# Patient Record
Sex: MALE | Race: BLACK OR AFRICAN AMERICAN | Employment: FULL TIME | URBAN - METROPOLITAN AREA
[De-identification: names, ages, dates, MRNs, and addresses within clinical notes are randomized per-mention and may not be internally consistent; named-entity substitution may affect disease eponyms.]

---

## 2017-02-16 ENCOUNTER — OP HISTORICAL/CONVERTED ENCOUNTER (OUTPATIENT)
Dept: OTHER | Age: 62
End: 2017-02-16

## 2018-12-13 LAB — PSA, EXTERNAL: 0.9

## 2019-06-13 LAB — CREATININE, EXTERNAL: 1.16

## 2020-06-22 VITALS
WEIGHT: 177.4 LBS | HEIGHT: 69 IN | DIASTOLIC BLOOD PRESSURE: 86 MMHG | HEART RATE: 85 BPM | RESPIRATION RATE: 18 BRPM | SYSTOLIC BLOOD PRESSURE: 148 MMHG | BODY MASS INDEX: 26.28 KG/M2 | TEMPERATURE: 97.3 F | OXYGEN SATURATION: 97 %

## 2020-06-22 PROBLEM — D17.1 LIPOMA OF BACK: Status: ACTIVE | Noted: 2020-06-22

## 2020-06-22 PROBLEM — J30.9 ALLERGIC RHINITIS: Status: ACTIVE | Noted: 2020-06-22

## 2020-06-22 PROBLEM — R03.0 ELEVATED BLOOD PRESSURE READING IN OFFICE WITHOUT DIAGNOSIS OF HYPERTENSION: Status: ACTIVE | Noted: 2020-06-22

## 2020-06-22 RX ORDER — METOPROLOL SUCCINATE 50 MG/1
TABLET, EXTENDED RELEASE ORAL DAILY
COMMUNITY
End: 2021-02-01 | Stop reason: ALTCHOICE

## 2020-06-22 RX ORDER — HYDROCHLOROTHIAZIDE 12.5 MG/1
12.5 TABLET ORAL DAILY
COMMUNITY
End: 2020-07-29 | Stop reason: SDUPTHER

## 2020-06-22 RX ORDER — LOSARTAN POTASSIUM 50 MG/1
TABLET ORAL DAILY
COMMUNITY
End: 2020-07-29 | Stop reason: SDUPTHER

## 2020-07-29 ENCOUNTER — OFFICE VISIT (OUTPATIENT)
Dept: INTERNAL MEDICINE CLINIC | Age: 65
End: 2020-07-29
Payer: COMMERCIAL

## 2020-07-29 VITALS
SYSTOLIC BLOOD PRESSURE: 162 MMHG | DIASTOLIC BLOOD PRESSURE: 92 MMHG | BODY MASS INDEX: 26.23 KG/M2 | TEMPERATURE: 98.4 F | WEIGHT: 177.1 LBS | HEART RATE: 67 BPM | RESPIRATION RATE: 18 BRPM | HEIGHT: 69 IN | OXYGEN SATURATION: 97 %

## 2020-07-29 DIAGNOSIS — I10 ESSENTIAL HYPERTENSION: Primary | ICD-10-CM

## 2020-07-29 PROCEDURE — 99213 OFFICE O/P EST LOW 20 MIN: CPT | Performed by: INTERNAL MEDICINE

## 2020-07-29 RX ORDER — HYDROCHLOROTHIAZIDE 12.5 MG/1
12.5 TABLET ORAL DAILY
Qty: 90 TAB | Refills: 1 | Status: SHIPPED | OUTPATIENT
Start: 2020-07-29 | End: 2021-02-22 | Stop reason: SDUPTHER

## 2020-07-29 RX ORDER — LOSARTAN POTASSIUM 50 MG/1
50 TABLET ORAL DAILY
Qty: 90 TAB | Refills: 1 | Status: SHIPPED | OUTPATIENT
Start: 2020-07-29 | End: 2021-02-22 | Stop reason: SDUPTHER

## 2020-07-29 NOTE — PROGRESS NOTES
Neeru Pompa is a 72 y.o. male and presents with Follow Up Chronic Condition and Hypertension  Patient presents for follow-up weight stable blood pressure 122/80 left arm 128/80 right arm after resting continues on hydrochlorothiazide 12.5 mg a day and losartan 50 mg a day but no longer takes metoprolol he seems to be doing fairly well. Discussed preventive care issues he was agreeable for obtaining labs drawn by office personnel for the studies below           Review of Systems  Constitutional: negative for fevers, chills, anorexia and weight loss  Eyes:   negative for visual disturbance and irritation  ENT:   negative for tinnitus,sore throat,nasal congestion,ear pains. hoarseness  Respiratory:  negative for cough, hemoptysis, dyspnea,wheezing  CV:   negative for chest pain, palpitations, lower extremity edema  GI:   negative for nausea, vomiting, diarrhea, abdominal pain,melena  Endo:               negative for polyuria,polydipsia,polyphagia,heat intolerance  Genitourinary: negative for frequency, dysuria and hematuria  Integument:  negative for rash and pruritus  Hematologic:  negative for easy bruising and gum/nose bleeding  Musculoskel: negative for myalgias, arthralgias, back pain, muscle weakness,  occas joint pain  Neurological:  negative for headaches, dizziness, vertigo, memory problems and gait   Behavl/Psych: negative for feelings of anxiety, depression, mood changes    Past Medical History:   Diagnosis Date    Allergic rhinitis 6/22/2020    Elevated blood pressure reading in office without diagnosis of hypertension 6/22/2020    Lipoma of back 6/22/2020     No past surgical history on file.   Social History     Socioeconomic History    Marital status:      Spouse name: Not on file    Number of children: Not on file    Years of education: Not on file    Highest education level: Not on file   Tobacco Use    Smoking status: Former Smoker     Packs/day: 0.25     Years: 15.00     Pack years: 3.75     Types: Cigarettes    Smokeless tobacco: Never Used   Other Topics Concern     Family History   Problem Relation Age of Onset    Heart Disease Brother      Current Outpatient Medications   Medication Sig Dispense Refill    losartan (COZAAR) 50 mg tablet Take 1 Tab by mouth daily. 90 Tab 1    hydroCHLOROthiazide (HYDRODIURIL) 12.5 mg tablet Take 1 Tab by mouth daily. 90 Tab 1    metoprolol succinate (TOPROL-XL) 50 mg XL tablet Take  by mouth daily. No Known Allergies    Objective:  Visit Vitals  BP (!) 162/92 (BP 1 Location: Left arm, BP Patient Position: Sitting)   Pulse 67   Temp 98.4 °F (36.9 °C) (Oral)   Resp 18   Ht 5' 9\" (1.753 m)   Wt 177 lb 1.6 oz (80.3 kg)   SpO2 97%   BMI 26.15 kg/m²     Physical Exam:   General appearance - alert, well appearing, and in no distress  Mental status - alert, oriented to person, place, and time  EYE-MARK, EOMI, fundi normal, corneas normal, no foreign bodies  ENT-ENT exam normal, no neck nodes or sinus tenderness  Nose - normal and patent, no erythema, discharge or polyps  Mouth - mucous membranes moist, pharynx normal without lesions  Neck - supple, no significant adenopathy   Chest - clear to auscultation, no wheezes, rales or rhonchi, symmetric air entry   Heart - normal rate, regular rhythm, normal S1, S2, no murmurs, rubs, clicks or gallops   Abdomen - soft, nontender, nondistended, no masses or organomegaly  Lymph- no adenopathy palpable  Ext-peripheral pulses normal, no pedal edema, no clubbing or cyanosis-DJD changes  Skin-Warm and dry. no hyperpigmentation, vitiligo, or suspicious lesions  Neuro -alert, oriented, normal speech, no focal findings or movement disorder noted      Results for orders placed or performed in visit on 06/22/20   AMB EXT CREATININE   Result Value Ref Range    Creatinine, External 1.16    AMB EXT PSA   Result Value Ref Range    PSA, External 0.9        Assessment/Plan:    ICD-10-CM ICD-9-CM    1.  Essential hypertension  I10 401.9 CBC WITH AUTOMATED DIFF      METABOLIC PANEL, COMPREHENSIVE      PSA, DIAGNOSTIC (PROSTATE SPECIFIC AG)      LIPID PANEL     Orders Placed This Encounter    CBC WITH AUTOMATED DIFF    METABOLIC PANEL, COMPREHENSIVE    PROSTATE SPECIFIC AG    LIPID PANEL    losartan (COZAAR) 50 mg tablet     Sig: Take 1 Tab by mouth daily. Dispense:  90 Tab     Refill:  1    hydroCHLOROthiazide (HYDRODIURIL) 12.5 mg tablet     Sig: Take 1 Tab by mouth daily. Dispense:  90 Tab     Refill:  1       increase physical activity, continue present plan, routine labs ordered, call if any problems    There are no Patient Instructions on file for this visit. Follow-up and Dispositions    · Return in about 6 months (around 1/29/2021).

## 2020-07-30 LAB
ALBUMIN SERPL-MCNC: 4.2 G/DL (ref 3.8–4.8)
ALBUMIN/GLOB SERPL: 1.6 {RATIO} (ref 1.2–2.2)
ALP SERPL-CCNC: 133 IU/L (ref 39–117)
ALT SERPL-CCNC: 8 IU/L (ref 0–44)
AST SERPL-CCNC: 18 IU/L (ref 0–40)
BASOPHILS # BLD AUTO: 0 X10E3/UL (ref 0–0.2)
BASOPHILS NFR BLD AUTO: 0 %
BILIRUB SERPL-MCNC: 0.5 MG/DL (ref 0–1.2)
BUN SERPL-MCNC: 9 MG/DL (ref 8–27)
BUN/CREAT SERPL: 9 (ref 10–24)
CALCIUM SERPL-MCNC: 9.1 MG/DL (ref 8.6–10.2)
CHLORIDE SERPL-SCNC: 99 MMOL/L (ref 96–106)
CHOLEST SERPL-MCNC: 183 MG/DL (ref 100–199)
CO2 SERPL-SCNC: 27 MMOL/L (ref 20–29)
CREAT SERPL-MCNC: 0.95 MG/DL (ref 0.76–1.27)
EOSINOPHIL # BLD AUTO: 0.3 X10E3/UL (ref 0–0.4)
EOSINOPHIL NFR BLD AUTO: 3 %
ERYTHROCYTE [DISTWIDTH] IN BLOOD BY AUTOMATED COUNT: 13.7 % (ref 11.6–15.4)
GLOBULIN SER CALC-MCNC: 2.7 G/DL (ref 1.5–4.5)
GLUCOSE SERPL-MCNC: 72 MG/DL (ref 65–99)
HCT VFR BLD AUTO: 39.3 % (ref 37.5–51)
HDLC SERPL-MCNC: 48 MG/DL
HGB BLD-MCNC: 13 G/DL (ref 13–17.7)
IMM GRANULOCYTES # BLD AUTO: 0 X10E3/UL (ref 0–0.1)
IMM GRANULOCYTES NFR BLD AUTO: 0 %
LDLC SERPL CALC-MCNC: 109 MG/DL (ref 0–99)
LYMPHOCYTES # BLD AUTO: 3.3 X10E3/UL (ref 0.7–3.1)
LYMPHOCYTES NFR BLD AUTO: 35 %
MCH RBC QN AUTO: 29.3 PG (ref 26.6–33)
MCHC RBC AUTO-ENTMCNC: 33.1 G/DL (ref 31.5–35.7)
MCV RBC AUTO: 89 FL (ref 79–97)
MONOCYTES # BLD AUTO: 0.6 X10E3/UL (ref 0.1–0.9)
MONOCYTES NFR BLD AUTO: 6 %
NEUTROPHILS # BLD AUTO: 5.4 X10E3/UL (ref 1.4–7)
NEUTROPHILS NFR BLD AUTO: 56 %
PLATELET # BLD AUTO: 273 X10E3/UL (ref 150–450)
POTASSIUM SERPL-SCNC: 3.5 MMOL/L (ref 3.5–5.2)
PROT SERPL-MCNC: 6.9 G/DL (ref 6–8.5)
PSA SERPL-MCNC: 1 NG/ML (ref 0–4)
RBC # BLD AUTO: 4.44 X10E6/UL (ref 4.14–5.8)
SODIUM SERPL-SCNC: 140 MMOL/L (ref 134–144)
TRIGL SERPL-MCNC: 130 MG/DL (ref 0–149)
VLDLC SERPL CALC-MCNC: 26 MG/DL (ref 5–40)
WBC # BLD AUTO: 9.6 X10E3/UL (ref 3.4–10.8)

## 2021-02-01 ENCOUNTER — OFFICE VISIT (OUTPATIENT)
Dept: INTERNAL MEDICINE CLINIC | Age: 66
End: 2021-02-01
Payer: COMMERCIAL

## 2021-02-01 VITALS
RESPIRATION RATE: 18 BRPM | OXYGEN SATURATION: 99 % | SYSTOLIC BLOOD PRESSURE: 150 MMHG | HEART RATE: 78 BPM | BODY MASS INDEX: 25.33 KG/M2 | WEIGHT: 171 LBS | HEIGHT: 69 IN | DIASTOLIC BLOOD PRESSURE: 90 MMHG | TEMPERATURE: 98.1 F

## 2021-02-01 DIAGNOSIS — Z23 ENCOUNTER FOR IMMUNIZATION: ICD-10-CM

## 2021-02-01 DIAGNOSIS — I10 ESSENTIAL HYPERTENSION: Primary | ICD-10-CM

## 2021-02-01 PROCEDURE — 99214 OFFICE O/P EST MOD 30 MIN: CPT | Performed by: INTERNAL MEDICINE

## 2021-02-01 PROCEDURE — 90471 IMMUNIZATION ADMIN: CPT | Performed by: INTERNAL MEDICINE

## 2021-02-01 PROCEDURE — 90732 PPSV23 VACC 2 YRS+ SUBQ/IM: CPT | Performed by: INTERNAL MEDICINE

## 2021-02-01 NOTE — PROGRESS NOTES
Phu Orta is a 72 y.o. male and presents with Follow-up and Hypertension  Patient presents for follow-up doing well inquiring about pneumonia vaccine which was given IM deltoid by staff pneumococcal 23 will receive next year Prevnar. We discussed later getting shingles vaccine he also wants to be on the list for coronavirus vaccine. He is doing well on hydrochlorothiazide 12.5 mg once a day losartan 50 mg a day but urinates 5 or 6 times a day he feels due to the diuretic no other signs to suggest diabetes labs drawn by me via left antecubital fossa flow studies below he 72years of age blood pressure resting 142/82 left arm usually runs a little high it was decided to discontinue HCTZ but increase losartan to 100 mg a day. Chest clear heart rate regular rate and rhythm some stigmata of osteoarthritis he works part-time for [address] [place] [address] [employer]: negative for fevers, chills, anorexia, weight loss and fatigue,no insomnia  Eyes:   negative for visual disturbance and irritation, eye discharge, eye pain. no eye redness. ENT:   negative for tinnitus, sore throat, nasal congestion, ear pain, hoarseness, hearing loss.,no snoring. Respiratory:  negative for cough, hemoptysis, shortness of breath, wheezing,  CV:   negative for chest pain, palpitations, lower extremity edema, shortness of breath while sitting, walking or at night  GI:   negative for nausea, vomiting, diarrhea, abdominal pain,melena,constipation. Endo:               negative for polyuria, polydipsia, polyphagia, cold or heat intolerance,hair loss. Genitourinary: negative for frequency, dysuria and hematuria,urethral discharge,nocturia.straining while urination,urinary incontinence.   Integument:  negative for rash and pruritus  Hematologic:  negative for easy bruising and gum/nose bleeding, enlarged nodes  Musculoskel: negative for myalgias, arthralgias, back pain, muscle weakness, joint pain, h/o fall,cramps,calf pain. DJD changes neurological:  negative for headaches, dizziness, vertigo, memory problems, gait and seizures loss of consciousness,no ataxia. Behavl/Psych: negative for feelings of anxiety, depression, mood changes ,sadness    Past Medical History:   Diagnosis Date    Allergic rhinitis 2020    Elevated blood pressure reading in office without diagnosis of hypertension 2020    Lipoma of back 2020     No past surgical history on file. Social History     Socioeconomic History    Marital status:      Spouse name: Not on file    Number of children: Not on file    Years of education: Not on file    Highest education level: Not on file   Tobacco Use    Smoking status: Former Smoker     Packs/day: 0.25     Years: 15.00     Pack years: 3.75     Types: Cigarettes     Quit date:      Years since quittin.0    Smokeless tobacco: Never Used   Other Topics Concern     Family History   Problem Relation Age of Onset    Heart Disease Brother      Current Outpatient Medications   Medication Sig Dispense Refill    losartan (COZAAR) 50 mg tablet Take 1 Tab by mouth daily. 90 Tab 1    hydroCHLOROthiazide (HYDRODIURIL) 12.5 mg tablet Take 1 Tab by mouth daily. 90 Tab 1     No Known Allergies    Objective:  Visit Vitals  BP (!) 150/90 (BP 1 Location: Left upper arm, BP Patient Position: Sitting)   Pulse 78   Temp 98.1 °F (36.7 °C) (Oral)   Resp 18   Ht 5' 9\" (1.753 m)   Wt 171 lb (77.6 kg)   SpO2 99%   BMI 25.25 kg/m²       Physical Exam:   Constitutional: General Appearance: healthy-appearing and obese. Level of Distress: NAD. Ambulation: ambulating normally. Psychiatric: Mental Status: normal mood and affect and active and alert. Orientation: to time, place, and person. no agitation. ,normal eye contact. normal insight  Head: Head: normocephalic and atraumatic. Eyes: Pupils: PERRLA. Sclerae: non-icteric. ENMT: No lesions on external ear, no hearing loss.  No lesions on external nose, sinus tenderness, or nasal discharge. Lips, Teeth, and no mouth or lip ulcers   Neck: Neck: supple, trachea midline, and no masses. Lymph Nodes: no cervical LAD. Thyroid: no enlargement or nodules and non-tender. Lungs: Respiratory effort: no dyspnea. Auscultation: no wheezing, rales/crackles, or rhonchi and breath sounds normal and good air movement. Cardiovascular: Apical Impulse: not displaced. Heart Auscultation: normal S1 and S2; no murmurs, rubs, or gallops; and RRR. Neck vessels: no carotid bruits. Pulses including femoral / pedal: normal throughout. Abdomen: Bowel Sounds: normal. Inspection and Palpation: no tenderness, guarding, or masses and soft and non-distended. Liver: non-tender  Musculoskeletal[de-identified] Extremities: no edema or varicosities. Calf tenderness. DJD  Neurologic: Gait and Station: normal gait and station. Motor Strength normal right and left. Sensory and cerebellar intact. Skin: Inspection and palpation: no rash, lesions, or ulcer. Results for orders placed or performed in visit on 07/29/20   CBC WITH AUTOMATED DIFF   Result Value Ref Range    WBC 9.6 3.4 - 10.8 x10E3/uL    RBC 4.44 4.14 - 5.80 x10E6/uL    HGB 13.0 13.0 - 17.7 g/dL    HCT 39.3 37.5 - 51.0 %    MCV 89 79 - 97 fL    MCH 29.3 26.6 - 33.0 pg    MCHC 33.1 31.5 - 35.7 g/dL    RDW 13.7 11.6 - 15.4 %    PLATELET 408 287 - 941 x10E3/uL    NEUTROPHILS 56 Not Estab. %    Lymphocytes 35 Not Estab. %    MONOCYTES 6 Not Estab. %    EOSINOPHILS 3 Not Estab. %    BASOPHILS 0 Not Estab. %    ABS. NEUTROPHILS 5.4 1.4 - 7.0 x10E3/uL    Abs Lymphocytes 3.3 (H) 0.7 - 3.1 x10E3/uL    ABS. MONOCYTES 0.6 0.1 - 0.9 x10E3/uL    ABS. EOSINOPHILS 0.3 0.0 - 0.4 x10E3/uL    ABS. BASOPHILS 0.0 0.0 - 0.2 x10E3/uL    IMMATURE GRANULOCYTES 0 Not Estab. %    ABS. IMM.  GRANS. 0.0 0.0 - 0.1 J14E0/CM   METABOLIC PANEL, COMPREHENSIVE   Result Value Ref Range    Glucose 72 65 - 99 mg/dL    BUN 9 8 - 27 mg/dL    Creatinine 0.95 0.76 - 1.27 mg/dL    GFR est non-AA 84 >59 mL/min/1.73    GFR est AA 97 >59 mL/min/1.73    BUN/Creatinine ratio 9 (L) 10 - 24    Sodium 140 134 - 144 mmol/L    Potassium 3.5 3.5 - 5.2 mmol/L    Chloride 99 96 - 106 mmol/L    CO2 27 20 - 29 mmol/L    Calcium 9.1 8.6 - 10.2 mg/dL    Protein, total 6.9 6.0 - 8.5 g/dL    Albumin 4.2 3.8 - 4.8 g/dL    GLOBULIN, TOTAL 2.7 1.5 - 4.5 g/dL    A-G Ratio 1.6 1.2 - 2.2    Bilirubin, total 0.5 0.0 - 1.2 mg/dL    Alk. phosphatase 133 (H) 39 - 117 IU/L    AST (SGOT) 18 0 - 40 IU/L    ALT (SGPT) 8 0 - 44 IU/L   PSA, DIAGNOSTIC (PROSTATE SPECIFIC AG)   Result Value Ref Range    Prostate Specific Ag 1.0 0.0 - 4.0 ng/mL   LIPID PANEL   Result Value Ref Range    Cholesterol, total 183 100 - 199 mg/dL    Triglyceride 130 0 - 149 mg/dL    HDL Cholesterol 48 >39 mg/dL    VLDL, calculated 26 5 - 40 mg/dL    LDL, calculated 109 (H) 0 - 99 mg/dL       Assessment/Plan:    ICD-10-CM ICD-9-CM    1. Essential hypertension  I10 401.9 LIPID PANEL      CBC WITH AUTOMATED DIFF      METABOLIC PANEL, COMPREHENSIVE      TSH 3RD GENERATION   2. Encounter for immunization  Z23 V03.89 PNEUMOCOCCAL POLYSACCHARIDE VACCINE, 23-VALENT, ADULT OR IMMUNOSUPPRESSED PT DOSE,     Orders Placed This Encounter    PNEUMOCOCCAL POLYSACCHARIDE VACCINE, 23-VALENT, ADULT OR IMMUNOSUPPRESSED PT DOSE,    LIPID PANEL    CBC WITH AUTOMATED DIFF    METABOLIC PANEL, COMPREHENSIVE    TSH 3RD GENERATION       routine labs ordered, call if any problems    There are no Patient Instructions on file for this visit. Follow-up and Dispositions    · Return in about 6 months (around 8/1/2021).

## 2021-02-03 LAB
ALBUMIN SERPL-MCNC: 4.1 G/DL (ref 3.8–4.8)
ALBUMIN/GLOB SERPL: 1.5 {RATIO} (ref 1.2–2.2)
ALP SERPL-CCNC: 120 IU/L (ref 39–117)
ALT SERPL-CCNC: 6 IU/L (ref 0–44)
AST SERPL-CCNC: 18 IU/L (ref 0–40)
BASOPHILS # BLD AUTO: 0 X10E3/UL (ref 0–0.2)
BASOPHILS NFR BLD AUTO: 0 %
BILIRUB SERPL-MCNC: 0.2 MG/DL (ref 0–1.2)
BUN SERPL-MCNC: 7 MG/DL (ref 8–27)
BUN/CREAT SERPL: 8 (ref 10–24)
CALCIUM SERPL-MCNC: 9.6 MG/DL (ref 8.6–10.2)
CHLORIDE SERPL-SCNC: 101 MMOL/L (ref 96–106)
CHOLEST SERPL-MCNC: 184 MG/DL (ref 100–199)
CO2 SERPL-SCNC: 29 MMOL/L (ref 20–29)
CREAT SERPL-MCNC: 0.89 MG/DL (ref 0.76–1.27)
EOSINOPHIL # BLD AUTO: 0.3 X10E3/UL (ref 0–0.4)
EOSINOPHIL NFR BLD AUTO: 4 %
ERYTHROCYTE [DISTWIDTH] IN BLOOD BY AUTOMATED COUNT: 13.9 % (ref 11.6–15.4)
GLOBULIN SER CALC-MCNC: 2.7 G/DL (ref 1.5–4.5)
GLUCOSE SERPL-MCNC: 87 MG/DL (ref 65–99)
HCT VFR BLD AUTO: 42.9 % (ref 37.5–51)
HDLC SERPL-MCNC: 55 MG/DL
HGB BLD-MCNC: 13.8 G/DL (ref 13–17.7)
IMM GRANULOCYTES # BLD AUTO: 0 X10E3/UL (ref 0–0.1)
IMM GRANULOCYTES NFR BLD AUTO: 0 %
LDLC SERPL CALC-MCNC: 104 MG/DL (ref 0–99)
LYMPHOCYTES # BLD AUTO: 3.5 X10E3/UL (ref 0.7–3.1)
LYMPHOCYTES NFR BLD AUTO: 36 %
MCH RBC QN AUTO: 29.4 PG (ref 26.6–33)
MCHC RBC AUTO-ENTMCNC: 32.2 G/DL (ref 31.5–35.7)
MCV RBC AUTO: 92 FL (ref 79–97)
MONOCYTES # BLD AUTO: 0.4 X10E3/UL (ref 0.1–0.9)
MONOCYTES NFR BLD AUTO: 4 %
MORPHOLOGY BLD-IMP: ABNORMAL
NEUTROPHILS # BLD AUTO: 5.4 X10E3/UL (ref 1.4–7)
NEUTROPHILS NFR BLD AUTO: 56 %
PLATELET # BLD AUTO: 285 X10E3/UL (ref 150–450)
POTASSIUM SERPL-SCNC: 3.9 MMOL/L (ref 3.5–5.2)
PROT SERPL-MCNC: 6.8 G/DL (ref 6–8.5)
RBC # BLD AUTO: 4.69 X10E6/UL (ref 4.14–5.8)
SODIUM SERPL-SCNC: 141 MMOL/L (ref 134–144)
TRIGL SERPL-MCNC: 144 MG/DL (ref 0–149)
TSH SERPL DL<=0.005 MIU/L-ACNC: 1.31 UIU/ML (ref 0.45–4.5)
VLDLC SERPL CALC-MCNC: 25 MG/DL (ref 5–40)
WBC # BLD AUTO: 9.7 X10E3/UL (ref 3.4–10.8)

## 2021-02-22 RX ORDER — HYDROCHLOROTHIAZIDE 12.5 MG/1
12.5 TABLET ORAL DAILY
Qty: 90 TAB | Refills: 1 | Status: SHIPPED | OUTPATIENT
Start: 2021-02-22 | End: 2021-08-27

## 2021-02-22 RX ORDER — LOSARTAN POTASSIUM 50 MG/1
50 TABLET ORAL DAILY
Qty: 90 TAB | Refills: 1 | Status: SHIPPED | OUTPATIENT
Start: 2021-02-22 | End: 2021-09-03 | Stop reason: SDUPTHER

## 2021-02-24 ENCOUNTER — TELEPHONE (OUTPATIENT)
Dept: INTERNAL MEDICINE CLINIC | Age: 66
End: 2021-02-24

## 2021-08-27 RX ORDER — HYDROCHLOROTHIAZIDE 12.5 MG/1
TABLET ORAL
Qty: 90 TABLET | Refills: 1 | Status: SHIPPED | OUTPATIENT
Start: 2021-08-27

## 2021-09-03 ENCOUNTER — TELEPHONE (OUTPATIENT)
Dept: INTERNAL MEDICINE CLINIC | Age: 66
End: 2021-09-03

## 2021-09-03 RX ORDER — LOSARTAN POTASSIUM 50 MG/1
50 TABLET ORAL DAILY
Qty: 90 TABLET | Refills: 0 | Status: SHIPPED | OUTPATIENT
Start: 2021-09-03 | End: 2021-12-06 | Stop reason: SDUPTHER

## 2021-09-03 NOTE — TELEPHONE ENCOUNTER
Madelyn faxed refill request for    losartan (COZAAR) 50 mg tablet 90 Tab with 1 refills   Sig - Route:  Take 1 Tab by mouth daily. - Oral

## 2021-12-06 ENCOUNTER — OFFICE VISIT (OUTPATIENT)
Dept: INTERNAL MEDICINE CLINIC | Age: 66
End: 2021-12-06
Payer: COMMERCIAL

## 2021-12-06 VITALS
WEIGHT: 166 LBS | OXYGEN SATURATION: 96 % | SYSTOLIC BLOOD PRESSURE: 136 MMHG | DIASTOLIC BLOOD PRESSURE: 84 MMHG | RESPIRATION RATE: 12 BRPM | HEART RATE: 88 BPM | BODY MASS INDEX: 24.59 KG/M2 | HEIGHT: 69 IN

## 2021-12-06 DIAGNOSIS — I10 ESSENTIAL HYPERTENSION: Primary | ICD-10-CM

## 2021-12-06 PROCEDURE — 99213 OFFICE O/P EST LOW 20 MIN: CPT | Performed by: INTERNAL MEDICINE

## 2021-12-06 RX ORDER — LOSARTAN POTASSIUM 50 MG/1
50 TABLET ORAL DAILY
Qty: 90 TABLET | Refills: 1 | Status: SHIPPED | OUTPATIENT
Start: 2021-12-06 | End: 2022-06-04

## 2021-12-06 NOTE — PROGRESS NOTES
Brooke Carr is a 77 y.o. male and presents with Follow-up  Patient presents for follow-up 77years of age doing well received last year the Prevnar pneumococcal vaccine has received the [employer] coronavirus vaccine and anticipating booster last colonoscopy within the past 10 years normal he says but I will check continues on Cozaar 50 mg once a day only blood pressure 132/80 we reviewed last labs July of last year white count 9600 hemoglobin of 13 platelet count 009,126 glucose 72 creatinine 0.95 potassium 3.5 calcium 9.1 PSA 1.0 SGOT 18 -we also reviewed labs from February of this year as noted in the chart patient wishes to forego labs until next visit doing quite well gainfully employed with [employer] lungs clear heart rate regular rate and rhythm denies any problems           Review of Systems  Constitutional: negative for fevers, chills, anorexia, weight loss and fatigue,no insomnia  Eyes:   negative for visual disturbance and irritation, eye discharge, eye pain. no eye redness. ENT:   negative for tinnitus, sore throat, nasal congestion, ear pain, hoarseness, hearing loss.,no snoring. Respiratory:  negative for cough, hemoptysis, shortness of breath, wheezing,  CV:   negative for chest pain, palpitations, lower extremity edema, shortness of breath while sitting, walking or at night  GI:   negative for nausea, vomiting, diarrhea, abdominal pain,melena,constipation. Endo:               negative for polyuria, polydipsia, polyphagia, cold or heat intolerance,hair loss. Genitourinary: negative for frequency, dysuria and hematuria,urethral discharge,nocturia.straining while urination,urinary incontinence. Integument:  negative for rash and pruritus  Hematologic:  negative for easy bruising and gum/nose bleeding, enlarged nodes  Musculoskel: negative for myalgias, arthralgias, back pain, muscle weakness, joint pain, h/o fall,cramps,calf pain.   Neurological:  negative for headaches, dizziness, vertigo, memory problems, gait and seizures loss of consciousness,no ataxia. Behavl/Psych: negative for feelings of anxiety, depression, mood changes ,sadness    Past Medical History:   Diagnosis Date    Allergic rhinitis 2020    Elevated blood pressure reading in office without diagnosis of hypertension 2020    Lipoma of back 2020     No past surgical history on file. Social History     Socioeconomic History    Marital status:    Tobacco Use    Smoking status: Former Smoker     Packs/day: 0.25     Years: 15.00     Pack years: 3.75     Types: Cigarettes     Quit date:      Years since quittin.9    Smokeless tobacco: Never Used     Family History   Problem Relation Age of Onset    Heart Disease Brother      Current Outpatient Medications   Medication Sig Dispense Refill    losartan (COZAAR) 50 mg tablet Take 1 Tablet by mouth daily for 180 days. 90 Tablet 1    hydroCHLOROthiazide (HYDRODIURIL) 12.5 mg tablet TAKE 1 TABLET BY MOUTH DAILY 90 Tablet 1     No Known Allergies    Objective:  Visit Vitals  /84 (BP 1 Location: Left upper arm, BP Patient Position: Sitting, BP Cuff Size: Adult)   Pulse 88   Resp 12   Ht 5' 9\" (1.753 m)   Wt 166 lb (75.3 kg)   SpO2 96%   BMI 24.51 kg/m²       Physical Exam:   Constitutional: General Appearance: healthy-appearing and obese. Level of Distress: NAD. Ambulation: ambulating normally. Psychiatric: Mental Status: normal mood and affect and active and alert. Orientation: to time, place, and person. no agitation. ,normal eye contact. normal insight  Head: Head: normocephalic and atraumatic. Eyes: Pupils: PERRLA. Sclerae: non-icteric. ENMT: No lesions on external ear, no hearing loss. No lesions on external nose, sinus tenderness, or nasal discharge. Lips, Teeth, and no mouth or lip ulcers   Neck: Neck: supple, trachea midline, and no masses. Lymph Nodes: no cervical LAD.  Thyroid: no enlargement or nodules and non-tender. Lungs: Respiratory effort: no dyspnea. Auscultation: no wheezing, rales/crackles, or rhonchi and breath sounds normal and good air movement. Cardiovascular: Apical Impulse: not displaced. Heart Auscultation: normal S1 and S2; no murmurs, rubs, or gallops; and RRR. Neck vessels: no carotid bruits. Pulses including femoral / pedal: normal throughout. Abdomen: Bowel Sounds: normal. Inspection and Palpation: no tenderness, guarding, or masses and soft and non-distended. Liver: non-tender and no hepatomegaly. Musculoskeletal[de-identified] Extremities: no edema or varicosities. Calf tenderness. Neurologic: Gait and Station: normal gait and station. Motor Strength normal right and left. Sensory and cerebellar intact. Skin: Inspection and palpation: no rash, lesions, or ulcer. Results for orders placed or performed in visit on 02/01/21   LIPID PANEL   Result Value Ref Range    Cholesterol, total 184 100 - 199 mg/dL    Triglyceride 144 0 - 149 mg/dL    HDL Cholesterol 55 >39 mg/dL    VLDL, calculated 25 5 - 40 mg/dL    LDL, calculated 104 (H) 0 - 99 mg/dL   CBC WITH AUTOMATED DIFF   Result Value Ref Range    WBC 9.7 3.4 - 10.8 x10E3/uL    RBC 4.69 4.14 - 5.80 x10E6/uL    HGB 13.8 13.0 - 17.7 g/dL    HCT 42.9 37.5 - 51.0 %    MCV 92 79 - 97 fL    MCH 29.4 26.6 - 33.0 pg    MCHC 32.2 31.5 - 35.7 g/dL    RDW 13.9 11.6 - 15.4 %    PLATELET 427 014 - 489 x10E3/uL    NEUTROPHILS 56 Not Estab. %    Lymphocytes 36 Not Estab. %    MONOCYTES 4 Not Estab. %    EOSINOPHILS 4 Not Estab. %    BASOPHILS 0 Not Estab. %    ABS. NEUTROPHILS 5.4 1.4 - 7.0 x10E3/uL    Abs Lymphocytes 3.5 (H) 0.7 - 3.1 x10E3/uL    ABS. MONOCYTES 0.4 0.1 - 0.9 x10E3/uL    ABS. EOSINOPHILS 0.3 0.0 - 0.4 x10E3/uL    ABS. BASOPHILS 0.0 0.0 - 0.2 x10E3/uL    IMMATURE GRANULOCYTES 0 Not Estab. %    ABS. IMM.  GRANS. 0.0 0.0 - 0.1 x10E3/uL    Hematology comments: Note:    METABOLIC PANEL, COMPREHENSIVE   Result Value Ref Range    Glucose 87 65 - 99 mg/dL BUN 7 (L) 8 - 27 mg/dL    Creatinine 0.89 0.76 - 1.27 mg/dL    GFR est non-AA 90 >59 mL/min/1.73    GFR est  >59 mL/min/1.73    BUN/Creatinine ratio 8 (L) 10 - 24    Sodium 141 134 - 144 mmol/L    Potassium 3.9 3.5 - 5.2 mmol/L    Chloride 101 96 - 106 mmol/L    CO2 29 20 - 29 mmol/L    Calcium 9.6 8.6 - 10.2 mg/dL    Protein, total 6.8 6.0 - 8.5 g/dL    Albumin 4.1 3.8 - 4.8 g/dL    GLOBULIN, TOTAL 2.7 1.5 - 4.5 g/dL    A-G Ratio 1.5 1.2 - 2.2    Bilirubin, total 0.2 0.0 - 1.2 mg/dL    Alk. phosphatase 120 (H) 39 - 117 IU/L    AST (SGOT) 18 0 - 40 IU/L    ALT (SGPT) 6 0 - 44 IU/L   TSH 3RD GENERATION   Result Value Ref Range    TSH 1.310 0.450 - 4.500 uIU/mL       Assessment/Plan:    ICD-10-CM ICD-9-CM    1. Essential hypertension  I10 401.9      Orders Placed This Encounter    losartan (COZAAR) 50 mg tablet     Sig: Take 1 Tablet by mouth daily for 180 days. Dispense:  90 Tablet     Refill:  1       increase physical activity, call if any problems    There are no Patient Instructions on file for this visit. Follow-up and Dispositions    · Return in about 6 months (around 6/6/2022).

## 2021-12-06 NOTE — PROGRESS NOTES
Chief Complaint   Patient presents with    Follow-up     Visit Vitals  /84 (BP 1 Location: Left upper arm, BP Patient Position: Sitting, BP Cuff Size: Adult)   Pulse 88   Resp 12   Ht 5' 9\" (1.753 m)   Wt 166 lb (75.3 kg)   SpO2 96%   BMI 24.51 kg/m²     1. Have you been to the ER, urgent care clinic since your last visit? Hospitalized since your last visit? No    2. Have you seen or consulted any other health care providers outside of the [address] [place] [address] since your last visit? Include any pap smears or colon screening.  No

## 2022-03-18 PROBLEM — D17.1 LIPOMA OF BACK: Status: ACTIVE | Noted: 2020-06-22

## 2022-03-19 PROBLEM — R03.0 ELEVATED BLOOD PRESSURE READING IN OFFICE WITHOUT DIAGNOSIS OF HYPERTENSION: Status: ACTIVE | Noted: 2020-06-22

## 2022-03-19 PROBLEM — J30.9 ALLERGIC RHINITIS: Status: ACTIVE | Noted: 2020-06-22

## 2023-04-03 ENCOUNTER — TELEPHONE (OUTPATIENT)
Dept: UROLOGY | Age: 68
End: 2023-04-03

## 2023-04-03 NOTE — TELEPHONE ENCOUNTER
Pt was seen at [facility] on 4/3 in [facility] for not being able to urinate but he can urinate some now  and back pain.  They also told him he had  a urine infection  Was told to see a urologist right away

## 2023-04-05 ENCOUNTER — OFFICE VISIT (OUTPATIENT)
Dept: UROLOGY | Age: 68
End: 2023-04-05
Payer: COMMERCIAL

## 2023-04-05 PROBLEM — N40.1 BENIGN PROSTATIC HYPERPLASIA WITH INCOMPLETE BLADDER EMPTYING: Status: ACTIVE | Noted: 2023-04-05

## 2023-04-05 PROBLEM — M54.50 ACUTE BILATERAL LOW BACK PAIN WITHOUT SCIATICA: Status: ACTIVE | Noted: 2023-04-05

## 2023-04-05 PROBLEM — N41.0 ACUTE PROSTATITIS: Status: ACTIVE | Noted: 2023-04-05

## 2023-04-05 PROBLEM — R39.14 BENIGN PROSTATIC HYPERPLASIA WITH INCOMPLETE BLADDER EMPTYING: Status: ACTIVE | Noted: 2023-04-05

## 2023-04-05 LAB
BILIRUB UR QL: NEGATIVE
GLUCOSE UR-MCNC: NEGATIVE MG/DL
KETONES P FAST UR STRIP-MCNC: NEGATIVE MG/DL
PH UR STRIP: 5.5 [PH] (ref 4.6–8)
PROT UR QL STRIP: NEGATIVE
PVR POC: NORMAL CC
SP GR UR STRIP: 1.02 (ref 1–1.03)
UA UROBILINOGEN AMB POC: NORMAL (ref 0.2–1)
URINALYSIS CLARITY POC: CLEAR
URINALYSIS COLOR POC: YELLOW
URINE BLOOD POC: NORMAL
URINE LEUKOCYTES POC: NORMAL
URINE NITRITES POC: NEGATIVE

## 2023-04-05 PROCEDURE — 99204 OFFICE O/P NEW MOD 45 MIN: CPT | Performed by: UROLOGY

## 2023-04-05 PROCEDURE — 81003 URINALYSIS AUTO W/O SCOPE: CPT | Performed by: UROLOGY

## 2023-04-05 PROCEDURE — 1123F ACP DISCUSS/DSCN MKR DOCD: CPT | Performed by: UROLOGY

## 2023-04-05 PROCEDURE — 51798 US URINE CAPACITY MEASURE: CPT | Performed by: UROLOGY

## 2023-04-05 RX ORDER — CEPHALEXIN 250 MG/1
500 CAPSULE ORAL 4 TIMES DAILY
End: 2023-04-05

## 2023-04-05 RX ORDER — CEPHALEXIN 500 MG/1
500 CAPSULE ORAL 2 TIMES DAILY
Qty: 20 CAPSULE | Refills: 0 | Status: SHIPPED
Start: 2023-04-05

## 2023-04-05 RX ORDER — LOSARTAN POTASSIUM 100 MG/1: 100 TABLET ORAL DAILY

## 2023-04-05 NOTE — ASSESSMENT & PLAN NOTE
Unsure if just arthritis, DJD vs related to prostatitis. Advised to hydrate and cut caffeine intake.

## 2023-04-05 NOTE — PROGRESS NOTES
HISTORY OF PRESENT ILLNESS  Stefanie Staples is a 79 y.o. male. has a past medical history of Allergic rhinitis (06/22/2020), Elevated blood pressure reading in office without diagnosis of hypertension (06/22/2020), Hypertension, and Lipoma of back (06/22/2020). has no past surgical history on file. Chief Complaint   Patient presents with    New Patient    Urinary Retention       1 1/2 - 2 months he has trouble emptying. He can void 20 min later of a second time of a small amount. He has a weak stream.  He denies hematuria, dysuria. He voids 5-10x per day. He denies nocturia. He denies incontinence. He has some straining and massages the urine out the urethra. He has back pain for 3-4 days, radiating bilaterally to the suprapubic area. He was told he may have a urine infection. He went to [facility] urgent care in [place] on 4/2/23. He had the back pain. He had fatigue and leg tiredness. He was told he had a UTI. He was given Keflex 500mg bid x 5 days. His urine is dark brown. He drinks Pepsi soda, about 2 fountain drinks per day. He sometimes drinks water. He drinks coffee in the morning. He drinks flavored water. IPSS 16/2. PVR 56    Chronic Conditions Addressed Today       1. Benign prostatic hyperplasia with incomplete bladder emptying     Current Assessment & Plan       Moderate IPSS. Probably more irritative than obstructive voiding. Relevant Medications     losartan (COZAAR) 100 mg tablet    2. Acute prostatitis - Primary     Current Assessment & Plan      Symptoms of prostatitis. Emptying adequately. I will keep him on abx for 10 additional day, 15 days total.  He should call if not improved. Relevant Medications     losartan (COZAAR) 100 mg tablet     Other Relevant Orders     AMB POC URINALYSIS DIP STICK AUTO W/O MICRO (Completed)     AMB POC PVR, BARRY,POST-VOID RES,US,NON-IMAGING (Completed)    3.  Acute bilateral low back pain without sciatica     Current Assessment & Plan      Unsure if just arthritis, DJD vs related to prostatitis. Advised to hydrate and cut caffeine intake. Past Medical History:    PMHx (including negatives):  has a past medical history of Allergic rhinitis (06/22/2020), Elevated blood pressure reading in office without diagnosis of hypertension (06/22/2020), Hypertension, and Lipoma of back (06/22/2020). PSurgHx:  has no past surgical history on file. PSocHx:  reports that he has been smoking cigarettes. He has a 11.25 pack-year smoking history. He has never used smokeless tobacco. He reports current alcohol use. He reports that he does not use drugs. FamilyHX:   Family History   Problem Relation Age of Onset    Heart Disease Brother      Review of Systems   All other systems reviewed and are negative. Physical Exam  Constitutional:       General: He is not in acute distress. Appearance: Normal appearance. HENT:      Head: Normocephalic and atraumatic. Eyes:      Extraocular Movements: Extraocular movements intact. Pupils: Pupils are equal, round, and reactive to light. Cardiovascular:      Rate and Rhythm: Normal rate and regular rhythm. Pulmonary:      Effort: Pulmonary effort is normal. No respiratory distress. Breath sounds: Normal breath sounds. No wheezing or rhonchi. Abdominal:      Palpations: There is no mass. Hernia: No hernia is present. Genitourinary:     Penis: Normal. No phimosis, hypospadias, tenderness or swelling. Testes: Normal.         Right: Mass, tenderness or swelling not present. Right testis is descended. Left: Mass, tenderness or swelling not present. Left testis is descended. Epididymis:      Right: Normal. Not inflamed or enlarged. No mass or tenderness. Left: Normal. Not inflamed or enlarged. No mass or tenderness. Prostate: Enlarged (3+, benign). Not tender and no nodules present.       Rectum: Normal.   Musculoskeletal: General: Normal range of motion. Lymphadenopathy:      Cervical: No cervical adenopathy. Upper Body:      Right upper body: No supraclavicular adenopathy. Left upper body: No supraclavicular adenopathy. Skin:     General: Skin is warm and dry. Neurological:      General: No focal deficit present. Mental Status: He is alert and oriented to person, place, and time. Psychiatric:         Mood and Affect: Mood normal.         Behavior: Behavior normal.     No Known Allergies  Current Outpatient Medications   Medication Sig Dispense Refill    losartan (COZAAR) 100 mg tablet Take 1 Tablet by mouth daily. cephALEXin (KEFLEX) 500 mg capsule Take 1 Capsule by mouth two (2) times a day. 20 Capsule 0      Results for orders placed or performed in visit on 04/05/23   AMB POC URINALYSIS DIP STICK AUTO W/O MICRO   Result Value Ref Range    Color (UA POC) Yellow     Clarity (UA POC) Clear     Glucose (UA POC) Negative Negative mg/dL    Bilirubin (UA POC) Negative Negative    Ketones (UA POC) Negative Negative    Specific gravity (UA POC) 1.025 1.001 - 1.035    Blood (UA POC) Trace Negative    pH (UA POC) 5.5 4.6 - 8.0    Protein (UA POC) Negative Negative    Urobilinogen (UA POC) 0.2 mg/dL 0.2 - 1    Nitrites (UA POC) Negative Negative    Leukocyte esterase (UA POC) Small Negative   AMB POC PVR, BARRY,POST-VOID RES,US,NON-IMAGING   Result Value Ref Range    PVR 56ml cc       ASSESSMENT and PLAN  Diagnoses and all orders for this visit:    1. Acute prostatitis  Assessment & Plan:  Symptoms of prostatitis. Emptying adequately. I will keep him on abx for 10 additional day, 15 days total.  He should call if not improved. Orders:  -     AMB POC URINALYSIS DIP STICK AUTO W/O MICRO  -     AMB POC PVR, BARRY,POST-VOID RES,US,NON-IMAGING    2. Benign prostatic hyperplasia with incomplete bladder emptying  Assessment & Plan: Moderate IPSS. Probably more irritative than obstructive voiding.        3. Acute bilateral low back pain without sciatica  Assessment & Plan:  Unsure if just arthritis, DJD vs related to prostatitis. Advised to hydrate and cut caffeine intake. Other orders  -     cephALEXin (KEFLEX) 500 mg capsule; Take 1 Capsule by mouth two (2) times a day. Follow-up and Dispositions    Return if symptoms worsen or fail to improve. Mima Gonzalez MD       Please note that portions of this note was potentially completed with Dragon dictation, the computer voice recognition software. Quite often unanticipated grammatical, syntax, homophones, and other interpretive errors are inadvertently transcribed by the computer software. Please disregard these errors. Please excuse any errors that have escaped final proofreading. Thank you.

## 2023-04-05 NOTE — PROGRESS NOTES
Chief Complaint   Patient presents with    New Patient    Urinary Retention     1. Have you been to the ER, urgent care clinic since your last visit? Hospitalized since your last visit? Yes [facility]     2. Have you seen or consulted any other health care providers outside of the [address] [place] [address] since your last visit? Include any pap smears or colon screening.  No  Visit Vitals  Ht 5' 9\" (1.753 m)   Wt 156 lb (70.8 kg)   BMI 23.04 kg/m²

## 2023-05-25 RX ORDER — CEPHALEXIN 500 MG/1
500 CAPSULE ORAL 2 TIMES DAILY
COMMUNITY
Start: 2023-04-05

## 2023-05-25 RX ORDER — LOSARTAN POTASSIUM 100 MG/1
100 TABLET ORAL DAILY
COMMUNITY
End: 2023-06-05

## 2023-06-05 ENCOUNTER — OFFICE VISIT (OUTPATIENT)
Facility: CLINIC | Age: 68
End: 2023-06-05
Payer: COMMERCIAL

## 2023-06-05 VITALS
HEART RATE: 65 BPM | WEIGHT: 158.4 LBS | SYSTOLIC BLOOD PRESSURE: 153 MMHG | HEIGHT: 69 IN | BODY MASS INDEX: 23.46 KG/M2 | OXYGEN SATURATION: 100 % | TEMPERATURE: 98.7 F | DIASTOLIC BLOOD PRESSURE: 85 MMHG

## 2023-06-05 DIAGNOSIS — Z72.0 TOBACCO ABUSE: ICD-10-CM

## 2023-06-05 DIAGNOSIS — I10 ESSENTIAL (PRIMARY) HYPERTENSION: ICD-10-CM

## 2023-06-05 DIAGNOSIS — R63.4 WEIGHT LOSS: ICD-10-CM

## 2023-06-05 DIAGNOSIS — Z11.59 NEED FOR HEPATITIS C SCREENING TEST: ICD-10-CM

## 2023-06-05 DIAGNOSIS — Z12.11 SCREENING FOR COLON CANCER: ICD-10-CM

## 2023-06-05 DIAGNOSIS — E78.00 PURE HYPERCHOLESTEROLEMIA: ICD-10-CM

## 2023-06-05 DIAGNOSIS — Z12.5 SCREENING FOR PROSTATE CANCER: ICD-10-CM

## 2023-06-05 DIAGNOSIS — R20.0 NUMBNESS OF RIGHT HAND: ICD-10-CM

## 2023-06-05 DIAGNOSIS — I10 ESSENTIAL (PRIMARY) HYPERTENSION: Primary | ICD-10-CM

## 2023-06-05 DIAGNOSIS — Z12.2 SCREENING FOR LUNG CANCER: ICD-10-CM

## 2023-06-05 PROCEDURE — 3079F DIAST BP 80-89 MM HG: CPT | Performed by: STUDENT IN AN ORGANIZED HEALTH CARE EDUCATION/TRAINING PROGRAM

## 2023-06-05 PROCEDURE — G8420 CALC BMI NORM PARAMETERS: HCPCS | Performed by: STUDENT IN AN ORGANIZED HEALTH CARE EDUCATION/TRAINING PROGRAM

## 2023-06-05 PROCEDURE — 1123F ACP DISCUSS/DSCN MKR DOCD: CPT | Performed by: STUDENT IN AN ORGANIZED HEALTH CARE EDUCATION/TRAINING PROGRAM

## 2023-06-05 PROCEDURE — 3077F SYST BP >= 140 MM HG: CPT | Performed by: STUDENT IN AN ORGANIZED HEALTH CARE EDUCATION/TRAINING PROGRAM

## 2023-06-05 PROCEDURE — 4004F PT TOBACCO SCREEN RCVD TLK: CPT | Performed by: STUDENT IN AN ORGANIZED HEALTH CARE EDUCATION/TRAINING PROGRAM

## 2023-06-05 PROCEDURE — 99214 OFFICE O/P EST MOD 30 MIN: CPT | Performed by: STUDENT IN AN ORGANIZED HEALTH CARE EDUCATION/TRAINING PROGRAM

## 2023-06-05 PROCEDURE — G8427 DOCREV CUR MEDS BY ELIG CLIN: HCPCS | Performed by: STUDENT IN AN ORGANIZED HEALTH CARE EDUCATION/TRAINING PROGRAM

## 2023-06-05 PROCEDURE — 3017F COLORECTAL CA SCREEN DOC REV: CPT | Performed by: STUDENT IN AN ORGANIZED HEALTH CARE EDUCATION/TRAINING PROGRAM

## 2023-06-05 RX ORDER — LOSARTAN POTASSIUM 50 MG/1
50 TABLET ORAL DAILY
COMMUNITY
End: 2023-06-06 | Stop reason: SDUPTHER

## 2023-06-05 SDOH — ECONOMIC STABILITY: FOOD INSECURITY: WITHIN THE PAST 12 MONTHS, THE FOOD YOU BOUGHT JUST DIDN'T LAST AND YOU DIDN'T HAVE MONEY TO GET MORE.: NEVER TRUE

## 2023-06-05 SDOH — ECONOMIC STABILITY: FOOD INSECURITY: WITHIN THE PAST 12 MONTHS, YOU WORRIED THAT YOUR FOOD WOULD RUN OUT BEFORE YOU GOT MONEY TO BUY MORE.: NEVER TRUE

## 2023-06-05 SDOH — ECONOMIC STABILITY: HOUSING INSECURITY
IN THE LAST 12 MONTHS, WAS THERE A TIME WHEN YOU DID NOT HAVE A STEADY PLACE TO SLEEP OR SLEPT IN A SHELTER (INCLUDING NOW)?: NO

## 2023-06-05 SDOH — ECONOMIC STABILITY: INCOME INSECURITY: HOW HARD IS IT FOR YOU TO PAY FOR THE VERY BASICS LIKE FOOD, HOUSING, MEDICAL CARE, AND HEATING?: NOT HARD AT ALL

## 2023-06-05 ASSESSMENT — PATIENT HEALTH QUESTIONNAIRE - PHQ9
SUM OF ALL RESPONSES TO PHQ QUESTIONS 1-9: 0
2. FEELING DOWN, DEPRESSED OR HOPELESS: 0
SUM OF ALL RESPONSES TO PHQ9 QUESTIONS 1 & 2: 0
1. LITTLE INTEREST OR PLEASURE IN DOING THINGS: 0
SUM OF ALL RESPONSES TO PHQ QUESTIONS 1-9: 0

## 2023-06-05 ASSESSMENT — ENCOUNTER SYMPTOMS
DIARRHEA: 0
COUGH: 0
CONSTIPATION: 0
SORE THROAT: 0
ABDOMINAL PAIN: 0
FACIAL SWELLING: 0
SHORTNESS OF BREATH: 0

## 2023-06-05 NOTE — PROGRESS NOTES
Chief Complaint   Patient presents with    Follow-up     Complains right hand fingers done    BP (!) 153/85 (Site: Left Upper Arm, Position: Sitting, Cuff Size: Small Adult)   Pulse 65   Temp 98.7 °F (37.1 °C) (Oral)   Ht 5' 9\" (1.753 m)   Wt 158 lb 6.4 oz (71.8 kg)   SpO2 100%   BMI 23.39 kg/m²  1. \"Have you been to the ER, urgent care clinic since your last visit? Hospitalized since your last visit? \" Yes Where: urgent care [facility] now for uti    2. \"Have you seen or consulted any other health care providers outside of the [address] [place] [address] since your last visit? \" Yes Where: [facility] care[facility] now      3. For patients aged 39-70: Has the patient had a colonoscopy / FIT/ Cologuard? Yes - Care Gap present. Rooming MA/LPN to request most recent results      If the patient is female:    4. For patients aged 41-77: Has the patient had a mammogram within the past 2 years? NA - based on age or sex      11. For patients aged 21-65: Has the patient had a pap smear?  NA - based on age or sex
in the last 2 years. He also complains of fatigue. No complaints of any blood in stools, dark stools, chest pain, shortness of breath, cough,, blood in urine. He states he previously had a colonoscopy maybe on 8 to 10 years ago, cannot member the name of the physician. He also complains of numbness in the tips of his fingers, going on since a couple of years on and off. No complaints of neck pain/stiffness. He follows with urology. Reviewed note from April, he was diagnosed with acute prostatitis and received antibiotics at that time. He also has BPH. He had visited the urgent care in April and was treated with UTI at that time. He is also current smoker, has been smoking since last 20 years. No Known Allergies  Current Outpatient Medications   Medication Sig Dispense Refill    losartan (COZAAR) 50 MG tablet Take 1 tablet by mouth daily      cephALEXin (KEFLEX) 500 MG capsule Take 1 capsule by mouth 2 times daily (Patient not taking: Reported on 6/5/2023)       No current facility-administered medications for this visit. Past Medical History:   Diagnosis Date    Allergic rhinitis 06/22/2020    Elevated blood pressure reading in office without diagnosis of hypertension 06/22/2020    Hypertension     Lipoma of back 06/22/2020     History reviewed. No pertinent surgical history. Family History   Problem Relation Age of Onset    Heart Disease Brother      Social History     Tobacco Use   Smoking Status Every Day    Packs/day: 0.25    Years: 20.00    Pack years: 5.00    Types: Cigarettes   Smokeless Tobacco Never         Review of Systems   Constitutional:  Negative for chills and fever. HENT:  Negative for ear pain, facial swelling and sore throat. Respiratory:  Negative for cough and shortness of breath. Cardiovascular:  Negative for chest pain and palpitations. Gastrointestinal:  Negative for abdominal pain, constipation and diarrhea.    Musculoskeletal:         As per hpi

## 2023-06-06 DIAGNOSIS — I10 ESSENTIAL (PRIMARY) HYPERTENSION: Primary | ICD-10-CM

## 2023-06-06 LAB
ALBUMIN SERPL-MCNC: 3.8 G/DL (ref 3.8–4.8)
ALBUMIN/GLOB SERPL: 1.3 {RATIO} (ref 1.2–2.2)
ALP SERPL-CCNC: 116 IU/L (ref 44–121)
ALT SERPL-CCNC: 6 IU/L (ref 0–44)
AST SERPL-CCNC: 18 IU/L (ref 0–40)
BILIRUB SERPL-MCNC: 0.5 MG/DL (ref 0–1.2)
BUN SERPL-MCNC: 12 MG/DL (ref 8–27)
BUN/CREAT SERPL: 13 (ref 10–24)
CALCIUM SERPL-MCNC: 9.5 MG/DL (ref 8.6–10.2)
CHLORIDE SERPL-SCNC: 104 MMOL/L (ref 96–106)
CHOLEST SERPL-MCNC: 192 MG/DL (ref 100–199)
CO2 SERPL-SCNC: 27 MMOL/L (ref 20–29)
CREAT SERPL-MCNC: 0.92 MG/DL (ref 0.76–1.27)
EGFRCR SERPLBLD CKD-EPI 2021: 91 ML/MIN/1.73
ERYTHROCYTE [DISTWIDTH] IN BLOOD BY AUTOMATED COUNT: 14.1 % (ref 11.6–15.4)
GLOBULIN SER CALC-MCNC: 2.9 G/DL (ref 1.5–4.5)
GLUCOSE SERPL-MCNC: 90 MG/DL (ref 70–99)
HCT VFR BLD AUTO: 40.2 % (ref 37.5–51)
HCV IGG SERPL QL IA: NON REACTIVE
HDLC SERPL-MCNC: 55 MG/DL
HGB BLD-MCNC: 13.6 G/DL (ref 13–17.7)
LDLC SERPL CALC-MCNC: 122 MG/DL (ref 0–99)
MCH RBC QN AUTO: 30 PG (ref 26.6–33)
MCHC RBC AUTO-ENTMCNC: 33.8 G/DL (ref 31.5–35.7)
MCV RBC AUTO: 89 FL (ref 79–97)
PLATELET # BLD AUTO: 235 X10E3/UL (ref 150–450)
POTASSIUM SERPL-SCNC: 4.9 MMOL/L (ref 3.5–5.2)
PROT SERPL-MCNC: 6.7 G/DL (ref 6–8.5)
PSA SERPL-MCNC: 1.3 NG/ML (ref 0–4)
RBC # BLD AUTO: 4.53 X10E6/UL (ref 4.14–5.8)
SODIUM SERPL-SCNC: 142 MMOL/L (ref 134–144)
T4 FREE SERPL-MCNC: 1.1 NG/DL (ref 0.82–1.77)
TRIGL SERPL-MCNC: 83 MG/DL (ref 0–149)
TSH SERPL DL<=0.005 MIU/L-ACNC: 0.33 UIU/ML (ref 0.45–4.5)
VLDLC SERPL CALC-MCNC: 15 MG/DL (ref 5–40)
WBC # BLD AUTO: 9.8 X10E3/UL (ref 3.4–10.8)

## 2023-06-06 RX ORDER — PRAVASTATIN SODIUM 20 MG
20 TABLET ORAL DAILY
Qty: 90 TABLET | Refills: 1 | Status: SHIPPED | OUTPATIENT
Start: 2023-06-06

## 2023-06-06 RX ORDER — LOSARTAN POTASSIUM 100 MG/1
50 TABLET ORAL DAILY
Qty: 90 TABLET | Refills: 1 | Status: SHIPPED | OUTPATIENT
Start: 2023-06-06

## 2023-06-29 DIAGNOSIS — I10 ESSENTIAL (PRIMARY) HYPERTENSION: ICD-10-CM

## 2023-06-30 DIAGNOSIS — I10 ESSENTIAL (PRIMARY) HYPERTENSION: ICD-10-CM

## 2023-06-30 RX ORDER — LOSARTAN POTASSIUM 100 MG/1
50 TABLET ORAL DAILY
Qty: 90 TABLET | Refills: 1 | Status: CANCELLED | OUTPATIENT
Start: 2023-06-30

## 2023-07-03 RX ORDER — LOSARTAN POTASSIUM 100 MG/1
100 TABLET ORAL DAILY
Qty: 90 TABLET | Refills: 1 | Status: SHIPPED | OUTPATIENT
Start: 2023-07-03

## 2023-07-13 ENCOUNTER — HOSPITAL ENCOUNTER (OUTPATIENT)
Facility: HOSPITAL | Age: 68
Discharge: HOME | End: 2023-07-13
Attending: STUDENT IN AN ORGANIZED HEALTH CARE EDUCATION/TRAINING PROGRAM
Payer: COMMERCIAL

## 2023-07-13 DIAGNOSIS — Z12.2 SCREENING FOR LUNG CANCER: ICD-10-CM

## 2023-07-13 PROCEDURE — 71271 CT THORAX LUNG CANCER SCR C-: CPT

## 2025-03-12 ENCOUNTER — TRANSCRIBE ORDERS (OUTPATIENT)
Facility: HOSPITAL | Age: 70
End: 2025-03-12

## 2025-03-12 DIAGNOSIS — I50.20 SYSTOLIC HEART FAILURE, UNSPECIFIED HF CHRONICITY (HCC): Primary | ICD-10-CM

## 2025-03-20 ENCOUNTER — HOSPITAL ENCOUNTER (OUTPATIENT)
Facility: HOSPITAL | Age: 70
Discharge: HOME | End: 2025-03-23
Payer: MEDICARE

## 2025-03-20 ENCOUNTER — HOSPITAL ENCOUNTER (OUTPATIENT)
Facility: HOSPITAL | Age: 70
Discharge: HOME | End: 2025-03-22
Payer: MEDICARE

## 2025-03-20 VITALS
BODY MASS INDEX: 23.4 KG/M2 | HEIGHT: 69 IN | DIASTOLIC BLOOD PRESSURE: 95 MMHG | WEIGHT: 158 LBS | SYSTOLIC BLOOD PRESSURE: 163 MMHG | HEART RATE: 60 BPM

## 2025-03-20 DIAGNOSIS — I50.20 SYSTOLIC HEART FAILURE, UNSPECIFIED HF CHRONICITY (HCC): ICD-10-CM

## 2025-03-20 LAB
ECHO BSA: 1.87 M2
NUC STRESS EJECTION FRACTION: 30 %
STRESS BASELINE DIAS BP: 95 MMHG
STRESS BASELINE HR: 60 BPM
STRESS BASELINE SYS BP: 163 MMHG
STRESS PERCENT HR ACHIEVED: 54 %
STRESS POST PEAK HR: 82 BPM
STRESS STAGE 1 BP: NORMAL MMHG
STRESS STAGE 1 DURATION: NORMAL MIN:SEC
STRESS STAGE 1 HR: 81 BPM
STRESS STAGE 2 DURATION: NORMAL MIN:SEC
STRESS STAGE 2 HR: 77 BPM
STRESS STAGE 3 BP: NORMAL MMHG
STRESS STAGE 3 DURATION: NORMAL MIN:SEC
STRESS STAGE 3 HR: 64 BPM
STRESS STAGE 4 DURATION: NORMAL MIN:SEC
STRESS STAGE 4 HR: 72 BPM
STRESS TARGET HR: 151 BPM

## 2025-03-20 PROCEDURE — 3430000000 HC RX DIAGNOSTIC RADIOPHARMACEUTICAL: Performed by: STUDENT IN AN ORGANIZED HEALTH CARE EDUCATION/TRAINING PROGRAM

## 2025-03-20 PROCEDURE — A9500 TC99M SESTAMIBI: HCPCS | Performed by: STUDENT IN AN ORGANIZED HEALTH CARE EDUCATION/TRAINING PROGRAM

## 2025-03-20 PROCEDURE — 78452 HT MUSCLE IMAGE SPECT MULT: CPT

## 2025-03-20 PROCEDURE — 6360000002 HC RX W HCPCS

## 2025-03-20 RX ORDER — TETRAKIS(2-METHOXYISOBUTYLISOCYANIDE)COPPER(I) TETRAFLUOROBORATE 1 MG/ML
27.5 INJECTION, POWDER, LYOPHILIZED, FOR SOLUTION INTRAVENOUS
Status: COMPLETED | OUTPATIENT
Start: 2025-03-20 | End: 2025-03-20

## 2025-03-20 RX ORDER — REGADENOSON 0.08 MG/ML
INJECTION, SOLUTION INTRAVENOUS
Status: COMPLETED
Start: 2025-03-20 | End: 2025-03-20

## 2025-03-20 RX ORDER — TETRAKIS(2-METHOXYISOBUTYLISOCYANIDE)COPPER(I) TETRAFLUOROBORATE 1 MG/ML
9.15 INJECTION, POWDER, LYOPHILIZED, FOR SOLUTION INTRAVENOUS
Status: COMPLETED | OUTPATIENT
Start: 2025-03-20 | End: 2025-03-20

## 2025-03-20 RX ADMIN — TETRAKIS(2-METHOXYISOBUTYLISOCYANIDE)COPPER(I) TETRAFLUOROBORATE 27.5 MILLICURIE: 1 INJECTION, POWDER, LYOPHILIZED, FOR SOLUTION INTRAVENOUS at 10:45

## 2025-03-20 RX ADMIN — REGADENOSON 0.4 MG: 0.08 INJECTION, SOLUTION INTRAVENOUS at 10:44

## 2025-03-20 RX ADMIN — TETRAKIS(2-METHOXYISOBUTYLISOCYANIDE)COPPER(I) TETRAFLUOROBORATE 9.15 MILLICURIE: 1 INJECTION, POWDER, LYOPHILIZED, FOR SOLUTION INTRAVENOUS at 09:10

## 2025-07-24 ENCOUNTER — HOSPITAL ENCOUNTER (EMERGENCY)
Facility: HOSPITAL | Age: 70
Discharge: HOME | End: 2025-07-24
Attending: EMERGENCY MEDICINE
Payer: MEDICARE

## 2025-07-24 VITALS
RESPIRATION RATE: 18 BRPM | DIASTOLIC BLOOD PRESSURE: 76 MMHG | SYSTOLIC BLOOD PRESSURE: 116 MMHG | TEMPERATURE: 98.6 F | WEIGHT: 160 LBS | OXYGEN SATURATION: 100 % | BODY MASS INDEX: 23.7 KG/M2 | HEART RATE: 79 BPM | HEIGHT: 69 IN

## 2025-07-24 DIAGNOSIS — N30.01 ACUTE CYSTITIS WITH HEMATURIA: Primary | ICD-10-CM

## 2025-07-24 LAB
APPEARANCE UR: ABNORMAL
BACTERIA URNS QL MICRO: ABNORMAL /HPF
BILIRUB UR QL: NEGATIVE
COLOR UR: ABNORMAL
EPITH CASTS URNS QL MICRO: ABNORMAL /LPF
GLUCOSE UR STRIP.AUTO-MCNC: NEGATIVE MG/DL
HGB UR QL STRIP: ABNORMAL
KETONES UR QL STRIP.AUTO: NEGATIVE MG/DL
LEUKOCYTE ESTERASE UR QL STRIP.AUTO: ABNORMAL
NITRITE UR QL STRIP.AUTO: NEGATIVE
PH UR STRIP: 5 (ref 5–8)
PROT UR STRIP-MCNC: >300 MG/DL
RBC #/AREA URNS HPF: >100 /HPF (ref 0–5)
SP GR UR REFRACTOMETRY: 1.01 (ref 1–1.03)
URINE CULTURE IF INDICATED: ABNORMAL
UROBILINOGEN UR QL STRIP.AUTO: 1 EU/DL (ref 0.2–1)
WBC URNS QL MICRO: ABNORMAL /HPF (ref 0–4)

## 2025-07-24 PROCEDURE — 99283 EMERGENCY DEPT VISIT LOW MDM: CPT

## 2025-07-24 PROCEDURE — 81001 URINALYSIS AUTO W/SCOPE: CPT

## 2025-07-24 PROCEDURE — 6370000000 HC RX 637 (ALT 250 FOR IP): Performed by: EMERGENCY MEDICINE

## 2025-07-24 PROCEDURE — 87086 URINE CULTURE/COLONY COUNT: CPT

## 2025-07-24 RX ORDER — CEPHALEXIN 500 MG/1
500 CAPSULE ORAL 4 TIMES DAILY
Qty: 21 CAPSULE | Refills: 0 | Status: SHIPPED | OUTPATIENT
Start: 2025-07-24 | End: 2025-07-31

## 2025-07-24 RX ORDER — CEPHALEXIN 500 MG/1
1000 CAPSULE ORAL
Status: COMPLETED | OUTPATIENT
Start: 2025-07-24 | End: 2025-07-24

## 2025-07-24 RX ADMIN — CEPHALEXIN 1000 MG: 500 CAPSULE ORAL at 21:53

## 2025-07-24 ASSESSMENT — LIFESTYLE VARIABLES
HOW OFTEN DO YOU HAVE A DRINK CONTAINING ALCOHOL: NEVER
HOW MANY STANDARD DRINKS CONTAINING ALCOHOL DO YOU HAVE ON A TYPICAL DAY: PATIENT DOES NOT DRINK

## 2025-07-24 ASSESSMENT — PAIN - FUNCTIONAL ASSESSMENT: PAIN_FUNCTIONAL_ASSESSMENT: NONE - DENIES PAIN

## 2025-07-25 NOTE — ED PROVIDER NOTES
[facility] EMERGENCY DEPT  EMERGENCY DEPARTMENT HISTORY AND PHYSICAL EXAM      Date: 7/24/2025  Patient Name: Jorge Aquino  MRN: [id number]  Birthdate 1955  Date of evaluation: 7/24/2025  Provider: Alessandro Simon MD  Note Started: 9:58 PM EDT 7/24/25    HISTORY OF PRESENT ILLNESS     Chief Complaint   Patient presents with    Hematuria     Started today.        History Provided By: Patient/wife    HPI: Jorge Aquino is a 70 y.o. male.  Patient presents with a complaint of hematuria that he noticed this afternoon.  No dysuria/urgency or frequency.  No abdominal pain.  No fever or chills.  No flank pain.  No nausea vomiting.  Patient has a history of BPH and has been followed by urologist.  Denies history of prostate cancer.  Patient is not on anticoagulation other than baby aspirin daily.        PAST MEDICAL HISTORY   Past Medical History:  Past Medical History:   Diagnosis Date    Allergic rhinitis 06/22/2020    Elevated blood pressure reading in office without diagnosis of hypertension 06/22/2020    Hypertension     Lipoma of back 06/22/2020       Past Surgical History:  No past surgical history on file.    Family History:  Family History   Problem Relation Age of Onset    Heart Disease Brother        Social History:  Social History     Tobacco Use    Smoking status: Every Day     Current packs/day: 0.25     Average packs/day: 0.3 packs/day for 20.0 years (5.0 ttl pk-yrs)     Types: Cigarettes    Smokeless tobacco: Never   Substance Use Topics    Alcohol use: Not Currently    Drug use: Never       Allergies:  No Known Allergies    PCP: Magali Barrett MD    Current Meds:   No current facility-administered medications for this encounter.     Current Outpatient Medications   Medication Sig Dispense Refill    cephALEXin (KEFLEX) 500 MG capsule Take 1 capsule by mouth 4 times daily for 7 days 21 capsule 0    losartan (COZAAR) 100 MG tablet Take 1 tablet by mouth daily 90 tablet 1    pravastatin (PRAVACHOL)

## 2025-07-25 NOTE — ED TRIAGE NOTES
Patient  with cc of passing blood in his urine. Started today, denies pain when urinating. Patient is being followed by [facility]. Pt was told he has a enlarged prostate and was given finasteride, however patient was not able to get the medication until a month later and just started it 2 days ago. Having dark red bleeding with some clots. PMH of high blood pressure.

## 2025-07-26 LAB
BACTERIA SPEC CULT: NORMAL
Lab: NORMAL